# Patient Record
Sex: FEMALE | Race: WHITE
[De-identification: names, ages, dates, MRNs, and addresses within clinical notes are randomized per-mention and may not be internally consistent; named-entity substitution may affect disease eponyms.]

---

## 2019-06-14 PROBLEM — Z00.00 ENCOUNTER FOR PREVENTIVE HEALTH EXAMINATION: Status: ACTIVE | Noted: 2019-06-14

## 2019-07-09 ENCOUNTER — APPOINTMENT (OUTPATIENT)
Dept: INFECTIOUS DISEASE | Facility: CLINIC | Age: 24
End: 2019-07-09
Payer: SELF-PAY

## 2019-07-09 DIAGNOSIS — Z71.89 OTHER SPECIFIED COUNSELING: ICD-10-CM

## 2019-07-09 PROCEDURE — 90691 TYPHOID VACCINE IM: CPT

## 2019-07-09 PROCEDURE — 90471 IMMUNIZATION ADMIN: CPT | Mod: NC

## 2019-07-09 PROCEDURE — 99401 PREV MED CNSL INDIV APPRX 15: CPT | Mod: 25

## 2019-07-09 RX ORDER — AZITHROMYCIN 500 MG/1
500 TABLET, FILM COATED ORAL
Qty: 3 | Refills: 0 | Status: ACTIVE | COMMUNITY
Start: 2019-07-09 | End: 1900-01-01

## 2019-07-29 ENCOUNTER — APPOINTMENT (OUTPATIENT)
Dept: INFECTIOUS DISEASE | Facility: CLINIC | Age: 24
End: 2019-07-29
Payer: SELF-PAY

## 2019-07-29 PROCEDURE — 90632 HEPA VACCINE ADULT IM: CPT

## 2019-07-29 PROCEDURE — 90471 IMMUNIZATION ADMIN: CPT | Mod: NC

## 2021-04-08 ENCOUNTER — APPOINTMENT (OUTPATIENT)
Dept: ORTHOPEDIC SURGERY | Facility: CLINIC | Age: 26
End: 2021-04-08
Payer: COMMERCIAL

## 2021-04-08 VITALS
WEIGHT: 210 LBS | HEIGHT: 68 IN | SYSTOLIC BLOOD PRESSURE: 138 MMHG | HEART RATE: 99 BPM | BODY MASS INDEX: 31.83 KG/M2 | DIASTOLIC BLOOD PRESSURE: 87 MMHG

## 2021-04-08 DIAGNOSIS — Z78.9 OTHER SPECIFIED HEALTH STATUS: ICD-10-CM

## 2021-04-08 DIAGNOSIS — M54.5 LOW BACK PAIN: ICD-10-CM

## 2021-04-08 PROCEDURE — 99072 ADDL SUPL MATRL&STAF TM PHE: CPT

## 2021-04-08 PROCEDURE — 99203 OFFICE O/P NEW LOW 30 MIN: CPT

## 2021-04-08 PROCEDURE — 72100 X-RAY EXAM L-S SPINE 2/3 VWS: CPT

## 2021-04-09 PROBLEM — M54.5 ACUTE LOW BACK PAIN WITHOUT SCIATICA, UNSPECIFIED BACK PAIN LATERALITY: Status: ACTIVE | Noted: 2021-04-09

## 2021-04-09 NOTE — DISCUSSION/SUMMARY
[de-identified] : 25 y/o female with right lower back pain. \par \par A discussion was had with the patient regarding symptomatic treatment for low back pain, and symptoms that are consistent with right lumbar strain without evidence of radicular symptoms. Strains are typically injuries to the muscle and can affect athletic function. Depending on the severity of injury, strains can cause local pain, swelling, tenderness and bruising. Low-grade strains may resolve within days-weeks, whereas high-grade strains may require as long as 6-8wks of recovery. Depending on the grade of severity, advanced imaging or referral to physical therapy may be indicated to confirm degree of injury and help speed recovery and to ameliorate acute symptoms.\par \par Recommendations; Begin trial of PT, Rx given. Conservative care & observation, this includes rest/activity avoidance until less symptomatic with subsequent gradual return to full activity as directed. Patient may also use OTC NSAID's or acetaminophen as tolerated, application of ice to the area 2-3x daily for 20 minutes after periods of activity, and elevate limb during periods of rest.\par \par Follow up as needed.

## 2021-04-09 NOTE — HISTORY OF PRESENT ILLNESS
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 She started to have pain after doing leg press weight 330 lb. The pain dull is brought on with walking, turning at waist and lifting. Localizes pain to buttock. She has used the rowing machine and the pain was present after the activity. She is not taking pain medication.  No radiation down the lower extremity. Denies numbness or tingling. \par \par The patient's past medical history, past surgical history, medications and allergies were reviewed by me today with the patient and documented accordingly. In addition, the patient's family and social history, which were noncontributory to this visit, were reviewed also.

## 2021-04-09 NOTE — ADDENDUM
[FreeTextEntry1] : This note was written by Nette Napoles on 04/08/2021 acting solely as a scribe for Dr. Lei Stevenson.\par \par All medical record entries made by the Scribe were at my, Dr. Lei Stevenson, direction and personally dictated by me on 04/08/2021. I have personally reviewed the chart and agree that the record accurately reflects my personal performance of the history, physical exam, assessment and plan.

## 2021-04-09 NOTE — PHYSICAL EXAM
[de-identified] : Oriented to time, place, person\par Mood: Normal\par Affect: Normal\par Appearance: Healthy, well appearing, no acute distress.\par Gait: Normal\par Assistive Devices: None\par \par Right Hip Exam:\par \par Skin: Clean, dry, intact\par Inspection: No obvious deformity, no swelling.\par Pulses: 2+ DP/PT pulses \par ROM:0-120 flexion. Internal rotation to 65. External rotation to 80. \par Painful ROM: None, No groin pain.\par Tenderness: +pain on the lower right paraspinal muscles. No tenderness over greater trochanter. No tenderness at gluteal insertion. No paraspinal tenderness. No axial lumbar tenderness. No sacroiliac tenderness. No TTP over the ASIS/Iliac crest.\par Strength: 5/5 hip flexion, 5/5 gluteal strength, 5/5 hip ADD, 5/5 hip ABD, 5/5 Q/H, 5/5 TA/GS/EHL\par Neuro: Sensation intact to light touch throughout, DTR normal, 1+ reflexes \par Additional tests: Negative impingement test, Negative CORA [de-identified] : The following radiographs were ordered and read by me during this patients visit. I reviewed each radiograph in detail with the patient and discussed the findings as highlighted below. \par \par 3 views of the lumbar spine were obtained today, 04/08/2021, that show no acute fracture or dislocation. There is no degenerative change seen. There is no gross malalignment. No significant other obvious osseous abnormality, otherwise unremarkable.